# Patient Record
Sex: FEMALE | Race: WHITE
[De-identification: names, ages, dates, MRNs, and addresses within clinical notes are randomized per-mention and may not be internally consistent; named-entity substitution may affect disease eponyms.]

---

## 2018-03-07 ENCOUNTER — HOSPITAL ENCOUNTER (EMERGENCY)
Dept: HOSPITAL 17 - NEPC | Age: 18
LOS: 1 days | Discharge: HOME | End: 2018-03-08
Payer: SELF-PAY

## 2018-03-07 VITALS
RESPIRATION RATE: 16 BRPM | DIASTOLIC BLOOD PRESSURE: 71 MMHG | SYSTOLIC BLOOD PRESSURE: 118 MMHG | TEMPERATURE: 97.6 F | OXYGEN SATURATION: 100 % | HEART RATE: 79 BPM

## 2018-03-07 VITALS
RESPIRATION RATE: 16 BRPM | OXYGEN SATURATION: 100 % | SYSTOLIC BLOOD PRESSURE: 112 MMHG | DIASTOLIC BLOOD PRESSURE: 71 MMHG | HEART RATE: 78 BPM

## 2018-03-07 VITALS
DIASTOLIC BLOOD PRESSURE: 72 MMHG | SYSTOLIC BLOOD PRESSURE: 111 MMHG | OXYGEN SATURATION: 100 % | RESPIRATION RATE: 16 BRPM | HEART RATE: 66 BPM

## 2018-03-07 VITALS
SYSTOLIC BLOOD PRESSURE: 112 MMHG | HEART RATE: 69 BPM | OXYGEN SATURATION: 100 % | RESPIRATION RATE: 16 BRPM | DIASTOLIC BLOOD PRESSURE: 60 MMHG

## 2018-03-07 VITALS — BODY MASS INDEX: 19.05 KG/M2 | HEIGHT: 68 IN | WEIGHT: 125.66 LBS

## 2018-03-07 DIAGNOSIS — E83.51: ICD-10-CM

## 2018-03-07 DIAGNOSIS — F12.90: ICD-10-CM
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
## 2018-03-07 DIAGNOSIS — N39.0: ICD-10-CM

## 2018-03-07 DIAGNOSIS — F10.929: Primary | ICD-10-CM

## 2018-03-07 LAB
ALBUMIN SERPL-MCNC: 3.5 GM/DL (ref 3.4–5)
ALP SERPL-CCNC: 66 U/L (ref 45–117)
ALT SERPL-CCNC: 15 U/L (ref 10–53)
APAP SERPL-MCNC: (no result) MCG/ML (ref 10–30)
AST SERPL-CCNC: 17 U/L (ref 15–37)
BASOPHILS # BLD AUTO: 0 TH/MM3 (ref 0–0.2)
BASOPHILS NFR BLD: 0.4 % (ref 0–2)
BILIRUB SERPL-MCNC: 0.2 MG/DL (ref 0.2–1)
BUN SERPL-MCNC: 10 MG/DL (ref 7–18)
CALCIUM SERPL-MCNC: 5.6 MG/DL (ref 8.5–10.1)
CALCIUM TP COR SERPL-MCNC: 5.9 MG/DL (ref 8.5–10.1)
CHLORIDE SERPL-SCNC: 103 MEQ/L (ref 98–107)
CREAT SERPL-MCNC: 0.66 MG/DL (ref 0.5–1)
EOSINOPHIL # BLD: 0 TH/MM3 (ref 0–0.4)
EOSINOPHIL NFR BLD: 0.1 % (ref 0–4)
ERYTHROCYTE [DISTWIDTH] IN BLOOD BY AUTOMATED COUNT: 13.5 % (ref 11.6–17.2)
GFR SERPLBLD BASED ON 1.73 SQ M-ARVRAT: 77 ML/MIN (ref 89–?)
GLUCOSE SERPL-MCNC: 112 MG/DL (ref 74–106)
HCO3 BLD-SCNC: 25.7 MEQ/L (ref 21–32)
HCT VFR BLD CALC: 37 % (ref 35–46)
HGB BLD-MCNC: 12.4 GM/DL (ref 11.6–15.3)
INR PPP: 1.6 RATIO
LYMPHOCYTES # BLD AUTO: 1.5 TH/MM3 (ref 1–4.8)
LYMPHOCYTES NFR BLD AUTO: 19.2 % (ref 9–44)
MAGNESIUM SERPL-MCNC: 2 MG/DL (ref 1.5–2.5)
MCH RBC QN AUTO: 29.3 PG (ref 27–34)
MCHC RBC AUTO-ENTMCNC: 33.5 % (ref 32–36)
MCV RBC AUTO: 87.5 FL (ref 80–100)
MONOCYTE #: 0.5 TH/MM3 (ref 0–0.9)
MONOCYTES NFR BLD: 6 % (ref 0–8)
NEUTROPHILS # BLD AUTO: 5.9 TH/MM3 (ref 1.8–7.7)
NEUTROPHILS NFR BLD AUTO: 74.3 % (ref 16–70)
PLATELET # BLD: 193 TH/MM3 (ref 150–450)
PMV BLD AUTO: 7.6 FL (ref 7–11)
PROT SERPL-MCNC: 6.5 GM/DL (ref 6.4–8.2)
PROTHROMBIN TIME: 15.9 SEC (ref 9.8–11.6)
RBC # BLD AUTO: 4.23 MIL/MM3 (ref 4–5.3)
SODIUM SERPL-SCNC: 140 MEQ/L (ref 136–145)
TROPONIN I SERPL-MCNC: (no result) NG/ML (ref 0.02–0.05)
WBC # BLD AUTO: 8 TH/MM3 (ref 4–11)

## 2018-03-07 PROCEDURE — 96375 TX/PRO/DX INJ NEW DRUG ADDON: CPT

## 2018-03-07 PROCEDURE — 99284 EMERGENCY DEPT VISIT MOD MDM: CPT

## 2018-03-07 PROCEDURE — 71045 X-RAY EXAM CHEST 1 VIEW: CPT

## 2018-03-07 PROCEDURE — 93005 ELECTROCARDIOGRAM TRACING: CPT

## 2018-03-07 PROCEDURE — 96365 THER/PROPH/DIAG IV INF INIT: CPT

## 2018-03-07 PROCEDURE — 81001 URINALYSIS AUTO W/SCOPE: CPT

## 2018-03-07 PROCEDURE — 83735 ASSAY OF MAGNESIUM: CPT

## 2018-03-07 PROCEDURE — 84484 ASSAY OF TROPONIN QUANT: CPT

## 2018-03-07 PROCEDURE — 84703 CHORIONIC GONADOTROPIN ASSAY: CPT

## 2018-03-07 PROCEDURE — 87086 URINE CULTURE/COLONY COUNT: CPT

## 2018-03-07 PROCEDURE — 80307 DRUG TEST PRSMV CHEM ANLYZR: CPT

## 2018-03-07 PROCEDURE — 96361 HYDRATE IV INFUSION ADD-ON: CPT

## 2018-03-07 PROCEDURE — 85610 PROTHROMBIN TIME: CPT

## 2018-03-07 PROCEDURE — 85730 THROMBOPLASTIN TIME PARTIAL: CPT

## 2018-03-07 PROCEDURE — 80053 COMPREHEN METABOLIC PANEL: CPT

## 2018-03-07 PROCEDURE — 85025 COMPLETE CBC W/AUTO DIFF WBC: CPT

## 2018-03-07 PROCEDURE — 84155 ASSAY OF PROTEIN SERUM: CPT

## 2018-03-07 NOTE — RADRPT
EXAM DATE/TIME:  03/07/2018 21:48 

 

[facility] COMPARISON:     

No previous studies available for comparison.

 

                     

INDICATIONS :     

Altered mental status. Possible ETOH.

                     

 

MEDICAL HISTORY :            

Unobtainable.   

 

SURGICAL HISTORY :        

Unobtainable.

 

ENCOUNTER:     

Initial                                        

 

ACUITY:     

1 day      

 

PAIN SCORE:     

Non-responsive.

 

LOCATION:     

Bilateral chest 

 

FINDINGS:     

A single view of the chest demonstrates the lungs to be symmetrically aerated without evidence of mas
s, infiltrate or effusion.  The cardiomediastinal contours are unremarkable.  Curvature of the upper 
thoracic spine convex to the right,  possibly positional.

 

CONCLUSION:     The lungs are clear.

 

 

 

 Boris Dahl MD on March 07, 2018 at 21:58           

Board Certified Radiologist.

 This report was verified electronically.

## 2018-03-07 NOTE — PD
HPI


Chief Complaint:  Alcohol/Drug Intoxication


Time Seen by Provider:  21:39


Travel History


International Travel<30 days:  No


Contact w/Intl Traveler<30days:  No


Traveled to known affect area:  No





History of Present Illness


HPI


Young adult female presents to the emergency department by private 

transportation in the care of friends for syncopal episode after alcohol 

ingestion reportedly.  Per  patient was drinking heavily today and 

then just collapsed while standing in line to go to a concert.  He states he 

immediately put her in a taxi and brought her here.  Does not report any 

episodes of not breathing.  Patient was drooling but no report of actual 

emesis.  According to a friend he does not know her well but to his knowledge 

he has no medical problems takes no medications he denies her ingesting any 

substances besides alcohol.





PFSH


Past Medical History


*** Narrative Medical


Unknown


Pregnant?:  Unknown





Social History


Alcohol Use:  Yes


Tobacco Use:  No





Allergies-Medications


(Allergen,Severity, Reaction):  


Coded Allergies:  


     No Known Allergies (Unverified , 3/8/18)


Reported Meds & Prescriptions





Reported Meds & Active Scripts


Active


Macrobid (Nitrofurantoin Monoh/Nitrofur Macro) 100 Mg Cap 100 Mg PO BID 10 Days








Review of Systems


ROS Limitations:  Clinical Condition, Intoxication, Poor Historian


Except as stated in HPI:  all other systems reviewed are Neg





Physical Exam


Narrative


GENERAL: Well-developed well-nourished female no acute respiratory distress 

somnolent but responds to verbal and tactile stimulation appears intoxicated.


SKIN: Warm and dry.


HEAD: Atraumatic. Normocephalic.  No scalp soft tissue swelling, abrasion, 

laceration, bony abnormality.


EYES: Pupils equal and round and reactive. No scleral icterus. No injection or 

drainage. 


ENT: No nasal bleeding or discharge.  Mucous membranes pink and moist.


NECK: Trachea midline. No JVD.  No midline tenderness or bony step-off


CARDIOVASCULAR: Regular rate and rhythm.  


RESPIRATORY: No accessory muscle use. Clear to auscultation. Breath sounds 

equal bilaterally. 


GASTROINTESTINAL: Abdomen soft, non-tender, nondistended. Hepatic and splenic 

margins not palpable. 


MUSCULOSKELETAL: Extremities without clubbing, cyanosis, or edema. No obvious 

deformities. 


NEUROLOGICAL: Awake and alert. No obvious cranial nerve deficits.  Motor 

grossly within normal limits. Five out of 5 muscle strength in the arms and 

legs.  Normal speech.


PSYCHIATRIC: Appropriate mood and affect; insight and judgment normal.





Data


Data


Last Documented VS





Orders





 Orders


Electrocardiogram (3/7/18 21:39)


Complete Blood Count With Diff (3/7/18 21:39)


Comprehensive Metabolic Panel (3/7/18 21:39)


Prothrombin Time / Inr (Pt) (3/7/18 21:39)


Act Partial Throm Time (Ptt) (3/7/18 21:39)


Troponin I (3/7/18 21:39)


Urinalysis - C+S If Indicated (3/7/18 21:39)


Chest, Single Ap (3/7/18 21:39)


Blood Glucose (3/7/18 21:39)


Ecg Monitoring (3/7/18 21:39)


Iv Access Insert/Monitor (3/7/18 21:39)


Oximetry (3/7/18 21:39)


Sodium Chloride 0.9% Flush (Ns Flush) (3/7/18 21:45)


Sodium Chlor 0.9% 1000 Ml Inj (Ns 1000 M (3/7/18 21:39)


Drug Screen, Random Urine (3/7/18 21:39)


Alcohol (Ethanol) (3/7/18 21:39)


Tylenol (Acetaminophen) (3/7/18 21:39)


Salicylates (Aspirin) (3/7/18 21:39)


Magnesium (Mg) (3/7/18 21:39)


Naloxone Inj (Narcan Inj) (3/7/18 21:45)


Ondansetron Inj (Zofran Inj) (3/7/18 21:45)


Urine Culture (3/8/18 00:40)


Ceftriaxone Inj (Rocephin Inj) (3/8/18 01:30)


Protein Corrected Calcium(Pcc) (3/7/18 23:48)


Ed Urine Pregnancytest Poc (3/8/18 02:26)


Ed Discharge Order (3/8/18 03:07)





Labs





Laboratory Tests








Test


  3/7/18


22:15 3/7/18


23:48 3/8/18


00:40


 


White Blood Count 8.0 TH/MM3   


 


Red Blood Count 4.23 MIL/MM3   


 


Hemoglobin 12.4 GM/DL   


 


Hematocrit 37.0 %   


 


Mean Corpuscular Volume 87.5 FL   


 


Mean Corpuscular Hemoglobin 29.3 PG   


 


Mean Corpuscular Hemoglobin


Concent 33.5 % 


  


  


 


 


Red Cell Distribution Width 13.5 %   


 


Platelet Count 193 TH/MM3   


 


Mean Platelet Volume 7.6 FL   


 


Neutrophils (%) (Auto) 74.3 %   


 


Lymphocytes (%) (Auto) 19.2 %   


 


Monocytes (%) (Auto) 6.0 %   


 


Eosinophils (%) (Auto) 0.1 %   


 


Basophils (%) (Auto) 0.4 %   


 


Neutrophils # (Auto) 5.9 TH/MM3   


 


Lymphocytes # (Auto) 1.5 TH/MM3   


 


Monocytes # (Auto) 0.5 TH/MM3   


 


Eosinophils # (Auto) 0.0 TH/MM3   


 


Basophils # (Auto) 0.0 TH/MM3   


 


CBC Comment DIFF FINAL   


 


Differential Comment    


 


Prothrombin Time 15.9 SEC   


 


Prothromb Time International


Ratio 1.6 RATIO 


  


  


 


 


Activated Partial


Thromboplast Time 34.8 SEC 


  


  


 


 


Blood Urea Nitrogen 10 MG/DL   


 


Creatinine 0.66 MG/DL   


 


Random Glucose 112 MG/DL   


 


Total Protein 6.5 GM/DL  6.8 GM/DL  


 


Albumin 3.5 GM/DL   


 


Calcium Level 5.6 MG/DL  7.8 MG/DL  


 


Magnesium Level 2.0 MG/DL   


 


Alkaline Phosphatase 66 U/L   


 


Aspartate Amino Transf


(AST/SGOT) 17 U/L 


  


  


 


 


Alanine Aminotransferase


(ALT/SGPT) 15 U/L 


  


  


 


 


Total Bilirubin 0.2 MG/DL   


 


Sodium Level 140 MEQ/L   


 


Potassium Level 4.9 MEQ/L   


 


Chloride Level 103 MEQ/L   


 


Carbon Dioxide Level 25.7 MEQ/L   


 


Anion Gap 11 MEQ/L   


 


Estimat Glomerular Filtration


Rate 77 ML/MIN 


  


  


 


 


Protein Corrected Calcium 5.9 MG/DL  8.0 MG/DL  


 


Troponin I


  LESS THAN 0.02


NG/ML 


  


 


 


Salicylates Level


  LESS THAN 1.7


MG/DL 


  


 


 


Acetaminophen Level


  LESS THAN 2.0


MCG/ML 


  


 


 


Ethyl Alcohol Level 299 MG/DL   


 


Urine Color   YELLOW 


 


Urine Turbidity   CLEAR 


 


Urine pH   6.0 


 


Urine Specific Gravity   1.015 


 


Urine Protein   NEG mg/dL 


 


Urine Glucose (UA)   NEG mg/dL 


 


Urine Ketones   10 mg/dL 


 


Urine Occult Blood   NEG 


 


Urine Nitrite   NEG 


 


Urine Bilirubin   NEG 


 


Urine Urobilinogen


  


  


  LESS THAN 2.0


MG/DL


 


Urine Leukocyte Esterase   NEG 


 


Urine RBC   2 /hpf 


 


Urine WBC   3 /hpf 


 


Urine Squamous Epithelial


Cells 


  


  4 /hpf 


 


 


Urine Bacteria   MANY /hpf 


 


Urine Hyaline Casts   7 /lpf 


 


Urine Mucus   MOD /lpf 


 


Microscopic Urinalysis Comment


  


  


  CATH-CULTURE


IND


 


Urine Opiates Screen   NEG 


 


Urine Barbiturates Screen   NEG 


 


Urine Amphetamines Screen   NEG 


 


Urine Benzodiazepines Screen   NEG 


 


Urine Cocaine Screen   NEG 


 


Urine Cannabinoids Screen   POS 











[facility]


Medical Decision Making


Medical Screen Exam Complete:  Yes


Emergency Medical Condition:  Yes


Medical Record Reviewed:  Yes


Interpretation(s)


EKG normal sinus rhythm rate 75 no acute ST elevation injury pattern or ectopy 

noted





Last Impressions








Chest X-Ray 3/7/18 2139 Signed





Impressions: 





 Service Date/Time:  Wednesday, March 7, 2018 21:48 - CONCLUSION: The lungs are 





 clear.     Boris Dahl MD 





CBC & BMP Diagram


3/7/18 22:15








Total Protein 6.5, Albumin 3.5, Calcium Level 5.6 *L, Magnesium Level 2.0, 

Alkaline Phosphatase 66, Aspartate Amino Transf (AST/SGOT) 17, Alanine 

Aminotransferase (ALT/SGPT) 15, Total Bilirubin 0.2








Vital Signs








  Date Time  Temp Pulse Resp B/P (MAP) Pulse Ox O2 Delivery O2 Flow Rate FiO2


 


3/8/18 01:00  66 15 111/72 (85) 100 Room Air  


 


3/8/18 00:45  81 19 120/67 (84) 100 Room Air  


 


3/8/18 00:00  77 20 106/65 (79) 100 Room Air  


 


3/7/18 23:00  69 16 112/60 (77) 100 Room Air  


 


3/7/18 22:00  66 16 111/72 (85) 100 Room Air  


 


3/7/18 21:47  78 16 112/71 (85) 100 Room Air  


 


3/7/18 21:38 97.6 79 16 118/71 (87) 100   





Urine drug screen: Positive for cannabinoids


Serum alcohol: 299, elevated


Urinalysis: Many bacteria


Differential Diagnosis


Altered mental status, polysubstance ingestion, alcohol intoxication, syncope, 

arrhythmia, electrolyte disturbance


Narrative Course


Adult female with bystander/friend reporting heavy alcohol ingestion with 

fainting/collapsing episode patient placed on cardiac monitor continuous pulse 

oximetry i.v. access obtained random glucose collected and patient ordered to 

receive Narcan 0.4 mg IV along with 1 L normal saline


EKG sinus rhythm with no acute injury pattern change noted








@ 9:55 PM patient is Ramiro Ac 17 years old denies any medical illnesses, 

medications or allergies and identifies she is in the ED





At 2:19 AM GCS is 15 patient is alert awake smiling eating lucille crackers and 

drinking Gatorade with friend at bedside patient at this time is stable for 

outpatient management she has been informed of her lab results including 

abnormal urinalysis cannabis and alcohol level upon arrival.  Patient is 

encouraged to discontinue use of alcohol as she is currently 17 and encouraged 

not to use cannabinoids as they are not a manufacture controlled substance at 

this time therefore unclear as to what may or may not have been added to the 

substance.  Patient is also provided a prescription of antibiotic.





Diagnosis





 Primary Impression:  


 Alcohol intoxication


 Additional Impressions:  


 Marijuana use


 UTI (urinary tract infection)


 Hypocalcemia


Referrals:  


Primary Care Physician


call for appointment


Patient Instructions:  General Instructions





***Additional Instructions:  


Complete course of antibiotic as prescribed


Do not drink alcoholic beverages


Do not use marijuana


Add calcium containing foods and beverages to dietary intake


Follow-up with your primary care provider


Return to the emergency department for any concerns or change in condition


***Med/Other Pt SpecificInfo:  Prescription(s) given


Scripts


Nitrofurantoin Monohydrate Macrocrystals (Macrobid) 100 Mg Cap


100 MG PO BID for Infection for 10 Days, #20 CAP 0 Refills


   Prov: Marietta Arambula MD         3/8/18


Disposition:  01 DISCHARGE HOME


Condition:  Stable











Marietta Arambula MD Mar 7, 2018 21:48

## 2018-03-08 VITALS
DIASTOLIC BLOOD PRESSURE: 67 MMHG | RESPIRATION RATE: 19 BRPM | OXYGEN SATURATION: 100 % | HEART RATE: 81 BPM | SYSTOLIC BLOOD PRESSURE: 120 MMHG

## 2018-03-08 VITALS
DIASTOLIC BLOOD PRESSURE: 65 MMHG | OXYGEN SATURATION: 100 % | SYSTOLIC BLOOD PRESSURE: 106 MMHG | HEART RATE: 77 BPM | RESPIRATION RATE: 20 BRPM

## 2018-03-08 VITALS
DIASTOLIC BLOOD PRESSURE: 72 MMHG | SYSTOLIC BLOOD PRESSURE: 111 MMHG | OXYGEN SATURATION: 100 % | HEART RATE: 66 BPM | RESPIRATION RATE: 15 BRPM

## 2018-03-08 VITALS — DIASTOLIC BLOOD PRESSURE: 56 MMHG | SYSTOLIC BLOOD PRESSURE: 108 MMHG

## 2018-03-08 LAB
BACTERIA #/AREA URNS HPF: (no result) /HPF
CALCIUM SERPL-MCNC: 7.8 MG/DL (ref 8.5–10.1)
CALCIUM TP COR SERPL-MCNC: 8 MG/DL (ref 8.5–10.1)
COLOR UR: YELLOW
GLUCOSE UR STRIP-MCNC: (no result) MG/DL
HGB UR QL STRIP: (no result)
HYALINE CASTS #/AREA URNS LPF: 7 /LPF
KETONES UR STRIP-MCNC: 10 MG/DL
MUCOUS THREADS #/AREA URNS LPF: (no result) /LPF
NITRITE UR QL STRIP: (no result)
PROT SERPL-MCNC: 6.8 GM/DL (ref 6.4–8.2)
SP GR UR STRIP: 1.01 (ref 1–1.03)
SQUAMOUS #/AREA URNS HPF: 4 /HPF (ref 0–5)
URINE LEUKOCYTE ESTERASE: (no result)

## 2018-03-08 NOTE — EKG
Date Performed: 03/07/2018       Time Performed: 21:41:56

 

PTAGE:      17 years

 

EKG:      Sinus rhythm 

 

 NORMAL ECG 

 

NO PREVIOUS TRACING            

 

DOCTOR:   Tree Rueda  Interpretating Date/Time  03/08/2018 21:18:19